# Patient Record
(demographics unavailable — no encounter records)

---

## 2024-10-18 NOTE — HEALTH RISK ASSESSMENT
[Yes] : Yes [4 or more  times a week (4 pts)] : 4 or more  times a week (4 points) [1 or 2 (0 pts)] : 1 or 2 (0 points) [0] : 2) Feeling down, depressed, or hopeless: Not at all (0) [PHQ-2 Negative - No further assessment needed] : PHQ-2 Negative - No further assessment needed [Patient reported mammogram was normal] : Patient reported mammogram was normal [Patient reported PAP Smear was normal] : Patient reported PAP Smear was normal [No] : In the past 12 months have you used drugs other than those required for medical reasons? No [Former] : Former [0-4] : 0-4 [> 15 Years] : > 15 Years [With Patient/Caregiver] : , with patient/caregiver [Designated Healthcare Proxy] : Designated healthcare proxy [Name: ___] : Health Care Proxy's Name: [unfilled]  [Relationship: ___] : Relationship: [unfilled] [With Significant Other] : lives with significant other [# of Members in Household ___] :  household currently consist of [unfilled] member(s) [Retired] : retired [Graduate School] : graduate school [Significant Other] : lives with significant other [Fully functional (bathing, dressing, toileting, transferring, walking, feeding)] : Fully functional (bathing, dressing, toileting, transferring, walking, feeding) [Fully functional (using the telephone, shopping, preparing meals, housekeeping, doing laundry, using] : Fully functional and needs no help or supervision to perform IADLs (using the telephone, shopping, preparing meals, housekeeping, doing laundry, using transportation, managing medications and managing finances) [Smoke Detector] : smoke detector [Carbon Monoxide Detector] : carbon monoxide detector [Audit-CScore] : 4 [de-identified] : treadmill 20-30 minutes [[id number]] : 0 [de-identified] : trivial smoking history -smoked only 2 yrs as a teenager [MammogramDate] : 8/13/24 [MammogramComments] : BIRADS 2 @ [facility] [PapSmearDate] : 9/17/24 [PapSmearComments] : Dr. Ramírez [ColonoscopyComments] : Dr. Brambila [de-identified] : retired  [FreeTextEntry3] : same sex partner [AdvancecareDate] : 10/2024

## 2024-10-18 NOTE — HEALTH RISK ASSESSMENT
[Yes] : Yes [4 or more  times a week (4 pts)] : 4 or more  times a week (4 points) [1 or 2 (0 pts)] : 1 or 2 (0 points) [0] : 2) Feeling down, depressed, or hopeless: Not at all (0) [PHQ-2 Negative - No further assessment needed] : PHQ-2 Negative - No further assessment needed [Patient reported mammogram was normal] : Patient reported mammogram was normal [Patient reported PAP Smear was normal] : Patient reported PAP Smear was normal [No] : In the past 12 months have you used drugs other than those required for medical reasons? No [Former] : Former [0-4] : 0-4 [> 15 Years] : > 15 Years [With Patient/Caregiver] : , with patient/caregiver [Designated Healthcare Proxy] : Designated healthcare proxy [Name: ___] : Health Care Proxy's Name: [unfilled]  [Relationship: ___] : Relationship: [unfilled] [With Significant Other] : lives with significant other [# of Members in Household ___] :  household currently consist of [unfilled] member(s) [Retired] : retired [Graduate School] : graduate school [Significant Other] : lives with significant other [Fully functional (bathing, dressing, toileting, transferring, walking, feeding)] : Fully functional (bathing, dressing, toileting, transferring, walking, feeding) [Fully functional (using the telephone, shopping, preparing meals, housekeeping, doing laundry, using] : Fully functional and needs no help or supervision to perform IADLs (using the telephone, shopping, preparing meals, housekeeping, doing laundry, using transportation, managing medications and managing finances) [Smoke Detector] : smoke detector [Carbon Monoxide Detector] : carbon monoxide detector [Audit-CScore] : 4 [de-identified] : treadmill 20-30 minutes [HXL8Ocyne] : 0 [de-identified] : trivial smoking history -smoked only 2 yrs as a teenager [MammogramDate] : 8/13/24 [MammogramComments] : BIRADS 2 @ [facility] [PapSmearDate] : 9/17/24 [PapSmearComments] : Dr. Ramírez [ColonoscopyComments] : Dr. Brambila [de-identified] : retired  [FreeTextEntry3] : same sex partner [AdvancecareDate] : 10/2024

## 2024-10-18 NOTE — HISTORY OF PRESENT ILLNESS
[de-identified] : HOLLY STOREY is a 64 year F who presents today for an Annual Physical Exam. Her hx is significant for CHD-she is PPM dependent, He also has thyroid disease, seronegative RA-on Humira, and chronic insomnia. She is medicated for all of her conditions.  She has recent labs ordered by her rheumatologist available for my review. They were drawn non-fasting. Both sugar and trigs were elevated.

## 2024-10-18 NOTE — HISTORY OF PRESENT ILLNESS
[de-identified] : HOLLY STOREY is a 64 year F who presents today for an Annual Physical Exam. Her hx is significant for CHD-she is PPM dependent, He also has thyroid disease, seronegative RA-on Humira, and chronic insomnia. She is medicated for all of her conditions.  She has recent labs ordered by her rheumatologist available for my review. They were drawn non-fasting. Both sugar and trigs were elevated.

## 2024-10-18 NOTE — PHYSICAL EXAM
[No Acute Distress] : no acute distress [Well Nourished] : well nourished [Well Developed] : well developed [Well-Appearing] : well-appearing [Normal Sclera/Conjunctiva] : normal sclera/conjunctiva [PERRL] : pupils equal round and reactive to light [EOMI] : extraocular movements intact [Normal Outer Ear/Nose] : the outer ears and nose were normal in appearance [Normal Oropharynx] : the oropharynx was normal [No JVD] : no jugular venous distention [No Lymphadenopathy] : no lymphadenopathy [Supple] : supple [Thyroid Normal, No Nodules] : the thyroid was normal and there were no nodules present [No Respiratory Distress] : no respiratory distress  [No Accessory Muscle Use] : no accessory muscle use [Clear to Auscultation] : lungs were clear to auscultation bilaterally [Normal Rate] : normal rate  [Regular Rhythm] : with a regular rhythm [Normal S1, S2] : normal S1 and S2 [No Murmur] : no murmur heard [No Carotid Bruits] : no carotid bruits [No Edema] : there was no peripheral edema [No Palpable Aorta] : no palpable aorta [No Extremity Clubbing/Cyanosis] : no extremity clubbing/cyanosis [Soft] : abdomen soft [Non Tender] : non-tender [Non-distended] : non-distended [No Masses] : no abdominal mass palpated [No HSM] : no HSM [Normal Bowel Sounds] : normal bowel sounds [Normal Supraclavicular Nodes] : no supraclavicular lymphadenopathy [Normal Anterior Cervical Nodes] : no anterior cervical lymphadenopathy [No CVA Tenderness] : no CVA  tenderness [No Spinal Tenderness] : no spinal tenderness [No Joint Swelling] : no joint swelling [Grossly Normal Strength/Tone] : grossly normal strength/tone [No Rash] : no rash [No Focal Deficits] : no focal deficits [Normal Gait] : normal gait [Deep Tendon Reflexes (DTR)] : deep tendon reflexes were 2+ and symmetric [Speech Grossly Normal] : speech grossly normal [Normal Affect] : the affect was normal [Normal Mood] : the mood was normal

## 2024-10-18 NOTE — REVIEW OF SYSTEMS
[Patient Intake Form Reviewed] : Patient intake form was reviewed [Nasal Discharge] : nasal discharge [Joint Pain] : joint pain [Joint Swelling] : joint swelling [Negative] : Heme/Lymph

## 2024-12-04 NOTE — PHYSICAL EXAM
[de-identified] : Right Knee: Range of Motion:       Claimant:  Normal: Flexion Active    135   135-degrees Flexion Passive    135   135-degrees Extension Active    0-5   0-5-degrees Extension Passive   0-5   0-5-degrees  No weakness to flexion/extension. No evidence of instability in the AP plane or varus or valgus stress. Negative Lachman. Negative pivot shift. Negative anterior drawer test. Negative posterior drawer test. Negative Sneha. Negative Apley grind. No medial or lateral joint line tenderness. Positive tenderness over the medial and lateral facet of the patella. Positive patellofemoral crepitations. No lateral tilting patella. No patella apprehension. Positive crepitation in the medial and lateral femoral condyle. No proximal or distal swelling, edema or tenderness. No gross motor or sensory deficits. Mild intra-articular swelling. 2+ DP and PT pulses. No varus or valgus malalignment. Multiple well healed scars.  Left Knee: Range of Motion:       Claimant:  Normal: Flexion Active    135   135-degrees Flexion Passive    135   135-degrees Extension Active    0-5   0-5-degrees Extension Passive   0-5   0-5-degrees  Multiple well-healed scars. No weakness to flexion/extension. No evidence of instability in the AP plane or varus or valgus stress. Negative Lachman. Negative pivot shift. Negative anterior drawer test. Negative posterior drawer test. Negative Sneha. Negative Apley grind. No medial or lateral joint line tenderness. Positive tenderness over the medial and lateral facet of the patella. Positive patellofemoral crepitations. No lateral tilting patella. No patella apprehension. Positive crepitation in the medial and lateral femoral condyle. No proximal or distal swelling, edema or tenderness. No gross motor or sensory deficits. Mild intra-articular swelling. 2+ DP and PT pulses. No varus or valgus malalignment. Skin is intact. No rashes, scars or lesions.  [de-identified] : Ambulating with a slightly antalgic to antalgic gait.  Station:  Normal.  [de-identified] : Appearance:  Well-developed, well-nourished female in no acute distress.

## 2024-12-04 NOTE — ADDENDUM
[FreeTextEntry1] : This note was written by Leonor Palacio on 12/04/2024 acting as a scribe for GERALD BERRIOS III, MD

## 2024-12-04 NOTE — DISCUSSION/SUMMARY
[de-identified] : At this time, due to osteoarthritis of the bilateral knees, I recommended she start a course of viscosupplementation. She will start the injections after December 24th.

## 2025-01-07 NOTE — PROCEDURE
[de-identified] : Consent: The risks and benefits of the procedure were discussed with the patient in detail.  Upon verbal consent of the patient, we proceeded with the Euflexxa injections as noted below.    Procedure: Under sterile conditions, the patient underwent a Euflexxa injection to the right and left knee of 20 mg sodium hyaluronate, 17 mg sodium chloride, 1.12 mg disodium hydrogen phosphate dodecahydrate, .10 mg sodium dihydrogen phosphate dehydrate in a 2 ml syringe without any complications.  The patient tolerated this well.   Indications: Osteoarthritis, bilateral knees : [employer] NDC#: [address]-0 Lot#:  [id number] Expiration:  11/30/25  Plan:  I have recommended ice and elevation.  The patient will be reassessed in one week for the next Euflexxa injection for the osteoarthritis of the right and left knee..

## 2025-01-07 NOTE — ADDENDUM
[FreeTextEntry1] : This note was written by Sindy Johnson on 01/07/2025 acting as scribe for Anju Franco OTR/CAMIAL, PA.

## 2025-01-07 NOTE — PHYSICAL EXAM
[de-identified] : Right Knee: Range of Motion:  Claimant: Normal: Flexion Active 135 135-degrees Flexion Passive 135 135-degrees Extension Active 0-5 0-5-degrees Extension Passive 0-5 0-5-degrees  No weakness to flexion/extension. No evidence of instability in the AP plane or varus or valgus stress. Negative Lachman. Negative pivot shift. Negative anterior drawer test. Negative posterior drawer test. Negative Sneha. Negative Apley grind. No medial or lateral joint line tenderness. Positive tenderness over the medial and lateral facet of the patella. Positive patellofemoral crepitations. No lateral tilting patella. No patella apprehension. Positive crepitation in the medial and lateral femoral condyle. No proximal or distal swelling, edema or tenderness. No gross motor or sensory deficits. Mild intra-articular swelling. 2+ DP and PT pulses. No varus or valgus malalignment. Multiple well healed scars.  Left Knee: Range of Motion:  Claimant: Normal: Flexion Active 135 135-degrees Flexion Passive 135 135-degrees Extension Active 0-5 0-5-degrees Extension Passive 0-5 0-5-degrees  Multiple well-healed scars. No weakness to flexion/extension. No evidence of instability in the AP plane or varus or valgus stress. Negative Lachman. Negative pivot shift. Negative anterior drawer test. Negative posterior drawer test. Negative Sneha. Negative Apley grind. No medial or lateral joint line tenderness. Positive tenderness over the medial and lateral facet of the patella. Positive patellofemoral crepitations. No lateral tilting patella. No patella apprehension. Positive crepitation in the medial and lateral femoral condyle. No proximal or distal swelling, edema or tenderness. No gross motor or sensory deficits. Mild intra-articular swelling. 2+ DP and PT pulses. No varus or valgus malalignment.

## 2025-01-13 NOTE — PHYSICAL EXAM
[de-identified] : Right Knee: Range of Motion: Claimant: Normal: Flexion Active 135 135-degrees Flexion Passive 135 135-degrees Extension Active 0-5 0-5-degrees Extension Passive 0-5 0-5-degrees  No weakness to flexion/extension. No evidence of instability in the AP plane or varus or valgus stress. Negative Lachman. Negative pivot shift. Negative anterior drawer test. Negative posterior drawer test. Negative Sneha. Negative Apley grind. No medial or lateral joint line tenderness. Positive tenderness over the medial and lateral facet of the patella. Positive patellofemoral crepitations. No lateral tilting patella. No patella apprehension. Positive crepitation in the medial and lateral femoral condyle. No proximal or distal swelling, edema or tenderness. No gross motor or sensory deficits. Mild intra-articular swelling. 2+ DP and PT pulses. No varus or valgus malalignment. Multiple well healed scars.  Left Knee: Range of Motion: Claimant: Normal: Flexion Active 135 135-degrees Flexion Passive 135 135-degrees Extension Active 0-5 0-5-degrees Extension Passive 0-5 0-5-degrees  Multiple well-healed scars. No weakness to flexion/extension. No evidence of instability in the AP plane or varus or valgus stress. Negative Lachman. Negative pivot shift. Negative anterior drawer test. Negative posterior drawer test. Negative Sneha. Negative Apley grind. No medial or lateral joint line tenderness. Positive tenderness over the medial and lateral facet of the patella. Positive patellofemoral crepitations. No lateral tilting patella. No patella apprehension. Positive crepitation in the medial and lateral femoral condyle. No proximal or distal swelling, edema or tenderness. No gross motor or sensory deficits. Mild intra-articular swelling. 2+ DP and PT pulses. No varus or valgus malalignment.

## 2025-01-13 NOTE — ADDENDUM
[FreeTextEntry1] : This note was written by Leonor Palacio on 01/13/2025 acting as scribe for Anju Franco, OTR/L, PA

## 2025-01-13 NOTE — PROCEDURE
[de-identified] : Consent: The risks and benefits of the procedure were discussed with the patient in detail. Upon verbal consent of the patient, we proceeded with the Euflexxa injections as noted below.  Procedure: Under sterile conditions, the patient underwent a Euflexxa injection to the right and left knee of 20 mg sodium hyaluronate, 17 mg sodium chloride, 1.12 mg disodium hydrogen phosphate dodecahydrate,.10 mg sodium dihydrogen phosphate dehydrate in a 2 ml syringe without any complications. The patient tolerated this well.  Indications: Osteoarthritis, bilateral knees : [employer] NDC#: [address]-7 Lot#: [id number] Expiration: 11/30/25  Plan: I have recommended ice and elevation. The patient will be reassessed in one week for the next Euflexxa injection for the osteoarthritis of the right and left knee.

## 2025-02-19 NOTE — PHYSICAL EXAM
[Normal Sclera/Conjunctiva] : normal sclera/conjunctiva [Normal Outer Ear/Nose] : the outer ears and nose were normal in appearance [Normal] : normal rate, regular rhythm, normal S1 and S2 and no murmur heard [No Edema] : there was no peripheral edema [Soft] : abdomen soft [Non Tender] : non-tender [Non-distended] : non-distended [Normal Supraclavicular Nodes] : no supraclavicular lymphadenopathy [Normal Anterior Cervical Nodes] : no anterior cervical lymphadenopathy [No Spinal Tenderness] : no spinal tenderness [Grossly Normal Strength/Tone] : grossly normal strength/tone [No Focal Deficits] : no focal deficits [Normal Gait] : normal gait [Speech Grossly Normal] : speech grossly normal [Normal Affect] : the affect was normal [Normal Mood] : the mood was normal

## 2025-02-19 NOTE — HISTORY OF PRESENT ILLNESS
[de-identified] : HOLLY STOREY is a 64 year F who presents today to follow-up- with complaints of not feeling well. She c/o episodic dizziness that seems to occur mostly when trying to exercise. Feels a little SOB on exercising. Not able to complete exercise with same intensity as she did prior to PPM placement. She saw the NP @ [facility] -settings were adjusted-but still doesn't feel 'right'. Says her SBP sometimes elevates to high 130s-140. She says thyroid meds lowered 6 mos ago by endocrinology. Gained a few pounds since then.  She takes Rinvoq for her RA. Humira stopped working. Finally, She is also c/o sinus congestion- previously used Flonase with good results.

## 2025-02-19 NOTE — HISTORY OF PRESENT ILLNESS
[de-identified] : HOLLY STOREY is a 64 year F who presents today to follow-up- with complaints of not feeling well. She c/o episodic dizziness that seems to occur mostly when trying to exercise. Feels a little SOB on exercising. Not able to complete exercise with same intensity as she did prior to PPM placement. She saw the NP @ [facility] -settings were adjusted-but still doesn't feel 'right'. Says her SBP sometimes elevates to high 130s-140. She says thyroid meds lowered 6 mos ago by endocrinology. Gained a few pounds since then.  She takes Rinvoq for her RA. Humira stopped working. Finally, She is also c/o sinus congestion- previously used Flonase with good results.

## 2025-02-19 NOTE — HEALTH RISK ASSESSMENT
[Former] : Former [0-4] : 0-4 [> 15 Years] : > 15 Years [de-identified] : trivial smoking history -smoked only 2 yrs as a teenager

## 2025-02-19 NOTE — HEALTH RISK ASSESSMENT
[Former] : Former [0-4] : 0-4 [> 15 Years] : > 15 Years [de-identified] : trivial smoking history -smoked only 2 yrs as a teenager

## 2025-03-04 NOTE — REVIEW OF SYSTEMS
[Feeling Fatigued] : feeling fatigued [Dyspnea on exertion] : dyspnea during exertion [Negative] : Psychiatric [SOB] : no shortness of breath [Chest Discomfort] : no chest discomfort [Leg Claudication] : no intermittent leg claudication [Palpitations] : no palpitations [Orthopnea] : no orthopnea [Syncope] : no syncope

## 2025-03-04 NOTE — CARDIOLOGY SUMMARY
[de-identified] : 3.4.35: a sensed v paced  3.4.25: SR with LBBB [de-identified] : 3.2023: exercise stress test: - normal exercise capacity, ECG negative for ischemia, normal HR and PB response, no arrhythmia, negative for ischemia  [de-identified] : 10.9.24: TTE: LV normal in size, concentric remodeling of LV, normal global wall motion, EF 55-60%, normal wall thickness. LA/RA normal in size, normal RV size and function. trace MR/TR.

## 2025-03-04 NOTE — ASSESSMENT
[FreeTextEntry1] : 64-year-old female with PMH of hypothyroid, rheumatoid arthritis, complete heart block s/p PPM (4.2024 MDT). In December 2024, she reported dizziness when exercising once HR reached ~130bpm. Upper tracking rate increased to 145bpm; AV delays adjusted to 300ms. She continues to have worsening dizziness, fatigue, WHEELER.   Dizziness/ PPM setting changes - Baseline ECG when V sensed shows LBBB- when V paced, LBBB is corrected, LVAT ~45ms - Paced AV interval changed from 300ms to 180, sensed AV 160ms - Rate adaptive AV turned on - DDDR 60- 145bpm upper sensor 130bpm - if symptoms persist consider increasing ADL and ADL response   ?HTN - pt will keep diary log of BP to share next visit, call office sooner if BP sustained >140/80 - would consider starting low dose ARB if essential hypertension found on BP log  F/u in 3 weeks

## 2025-03-04 NOTE — HISTORY OF PRESENT ILLNESS
[FreeTextEntry1] : This is a 64-year-old female with PMH of hypothyroid, rheumatoid arthritis, complete heart block s/p PPM (4.2024 MDT). In December 2024, she reported dizziness when exercising once HR reached ~130bpm. Upper tracking rate increased to 145bpm; AV delays adjusted to 300ms. She followed up a week later with improvement in her symptoms. She presents today with worsening dizziness with mild to moderate activity, extreme fatigue, and WHEELER.

## 2025-03-04 NOTE — CARDIOLOGY SUMMARY
[de-identified] : 3.4.35: a sensed v paced  3.4.25: SR with LBBB [de-identified] : 3.2023: exercise stress test: - normal exercise capacity, ECG negative for ischemia, normal HR and PB response, no arrhythmia, negative for ischemia  [de-identified] : 10.9.24: TTE: LV normal in size, concentric remodeling of LV, normal global wall motion, EF 55-60%, normal wall thickness. LA/RA normal in size, normal RV size and function. trace MR/TR.

## 2025-04-01 NOTE — CARDIOLOGY SUMMARY
[de-identified] : 3.4.35: a sensed v paced  3.4.25: SR with LBBB [de-identified] : 3.2023: exercise stress test: - normal exercise capacity, ECG negative for ischemia, normal HR and PB response, no arrhythmia, negative for ischemia  [de-identified] : 10.9.24: TTE: LV normal in size, concentric remodeling of LV, normal global wall motion, EF 55-60%, normal wall thickness. LA/RA normal in size, normal RV size and function. trace MR/TR.

## 2025-04-01 NOTE — ASSESSMENT
[FreeTextEntry1] : 64-year-old female with PMH of hypothyroid, rheumatoid arthritis, complete heart block s/p PPM (4.2024 MDT). In December 2024, she reported dizziness when exercising once HR reached ~130bpm. Last seen in March with DVC changes to paced/sensed AV intervals with rate adaptive AV turned on. Pt does report improvement in dizziness, fatigue, however, continues to have WHEELER with stair climbing.    Dizziness/ PPM setting changes - symptoms of dizziness and fatigued improved with paced/sensed AV interval changes (see prior note)  - no further changes made to PPM - symptoms of WHEELER could be overlap with need for conditioning vs limitations on PPM settings - continue to monitor remotely and she will report any symptom changes   Elevated BP - BP log which shows 130-149/80-94. Continue lifestyle changes such as decreasing salt, continue exercise as tolerated  - pt has f/u with Dr. Golden in May, if BP log remains elevated consider starting low dose ARB if essential hypertension found on BP log  F/u in 6 months

## 2025-04-01 NOTE — HISTORY OF PRESENT ILLNESS
[FreeTextEntry1] : This is a 64-year-old female with PMH of hypothyroid, rheumatoid arthritis, complete heart block s/p PPM (4.2024 MDT). In December 2024, she reported dizziness when exercising once HR reached ~130bpm. Last seen in March with DVC changes to paced/sensed AV intervals with rate adaptive AV turned on. Pt does report improvement in dizziness, fatigue, however, continues to have WHEELER with stair climbing.  Last visit BP was noted to be elevated, she shared BP log which shows -149/80-94. She is very active and committed to decreasing salt and continuing activities.

## 2025-04-01 NOTE — REVIEW OF SYSTEMS
[Dyspnea on exertion] : dyspnea during exertion [SOB] : no shortness of breath [Chest Discomfort] : no chest discomfort [Leg Claudication] : no intermittent leg claudication [Palpitations] : no palpitations [Orthopnea] : no orthopnea [Syncope] : no syncope [Negative] : Constitutional

## 2025-06-09 NOTE — HISTORY OF PRESENT ILLNESS
[de-identified] : The patient comes in today for a five month follow up and she wants to order visco for the bilateral knees.

## 2025-06-09 NOTE — ADDENDUM
[FreeTextEntry1] : This note was written by Leonor Palacio on 06/09/2025 acting as scribe for Anju Franco, OTR/L, PA

## 2025-06-09 NOTE — DISCUSSION/SUMMARY
[de-identified] : At this time, due to osteoarthritis of the bilateral knees, we will order Euflexxa for her again. The patient states she has been doing very well with the injections, so every six months we will continue doing the Euflexxa for her. She has been doing home exercises in between and she will continue to do that and we will follow up with her.

## 2025-06-09 NOTE — PHYSICAL EXAM
[de-identified] : Right Knee: Range of Motion in Degrees                     Claimant: Normal:  Flexion Active   135                  135-degrees  Flexion Passive   135                 135-degrees  Extension Active   0-5                 0-5-degrees  Extension Passive   0-5                 0-5-degrees    No weakness to flexion/extension.  No evidence of instability in the AP plane or varus or valgus stress.  Negative Lachman. Negative pivot shift.  Negative anterior drawer test.  Negative posterior drawer test.  Negative Sneha.  Negative Apley grind.  No medial or lateral joint line tenderness.  Positive tenderness over the medial and lateral facet of the patella.  Positive patellofemoral crepitations.  No lateral tilting patella.  No patella apprehension.  Positive crepitation in the medial and lateral femoral condyle.  No proximal or distal swelling, edema or tenderness.  No gross motor or sensory deficits.  Mild intra-articular swelling.  2+ DP and PT pulses.  No varus or valgus malalignment.  Skin is intact.  No rashes, scars or lesions.   Left Knee: Range of Motion in Degrees                     Claimant: Normal:  Flexion Active   135                  135-degrees  Flexion Passive   135                 135-degrees  Extension Active   0-5                 0-5-degrees  Extension Passive   0-5                 0-5-degrees    No weakness to flexion/extension.  No evidence of instability in the AP plane or varus or valgus stress.  Negative Lachman. Negative pivot shift.  Negative anterior drawer test.  Negative posterior drawer test.  Negative Sneha.  Negative Apley grind.  No medial or lateral joint line tenderness.  Positive tenderness over the medial and lateral facet of the patella.  Positive patellofemoral crepitations.  No lateral tilting patella.  No patella apprehension.  Positive crepitation in the medial and lateral femoral condyle.  No proximal or distal swelling, edema or tenderness.  No gross motor or sensory deficits.  Mild intra-articular swelling.  2+ DP and PT pulses.  No varus or valgus malalignment.  Skin is intact.  No rashes, scars or lesions.  [de-identified] : Ambulating with a slightly antalgic to antalgic gait.  Station:  Normal.  [de-identified] : Appearance:  Well-developed, well-nourished female in no acute distress.    [de-identified] : Radiographs, which were taken in the office today, one-two views of the right knee and one-two views of the left knee, including AP Standing, reveals osteoarthritis of the bilateral knees.

## 2025-06-10 NOTE — PLAN
[FreeTextEntry1] :  THIS VISIT WAS PART OF AN ONGOING LONGITUDAL RELATIONSHIP DESIGNED TO ADDRESS THE MAJORITY OF THE PATIENT'S HEALTH CARE NEEDS WITH CONSISTENCY OVER A LONG PERIOD OF TIME.

## 2025-06-10 NOTE — HISTORY OF PRESENT ILLNESS
[de-identified] : HOLLY STOREY is a 64 year F who presents today to follow-up- on her BP. She has been taking home readings over the last several weeks and report that many of the readings are borderline. There is HTN in her family.  She has been trying to focus on her diet-but reports that upon review- there is little in the way of sodium in her diet.

## 2025-06-10 NOTE — PHYSICAL EXAM
[Normal] : no acute distress, well nourished, well developed and well-appearing [Normal Sclera/Conjunctiva] : normal sclera/conjunctiva [Normal Outer Ear/Nose] : the outer ears and nose were normal in appearance [No Respiratory Distress] : no respiratory distress  [No Accessory Muscle Use] : no accessory muscle use [Normal Rate] : normal rate  [Regular Rhythm] : with a regular rhythm [No Edema] : there was no peripheral edema [Speech Grossly Normal] : speech grossly normal [Normal Affect] : the affect was normal [Normal Mood] : the mood was normal [de-identified] : 146/90

## 2025-06-26 NOTE — PHYSICAL EXAM
[Normal] : no acute distress, well nourished, well developed and well-appearing [Normal Sclera/Conjunctiva] : normal sclera/conjunctiva [Normal Outer Ear/Nose] : the outer ears and nose were normal in appearance [No Respiratory Distress] : no respiratory distress  [No Accessory Muscle Use] : no accessory muscle use [No Focal Deficits] : no focal deficits [Normal Gait] : normal gait [Speech Grossly Normal] : speech grossly normal [Normal Mood] : the mood was normal [No Lymphadenopathy] : no lymphadenopathy [Normal Rate] : normal rate  [Grossly Normal Strength/Tone] : grossly normal strength/tone [Alert and Oriented x3] : oriented to person, place, and time

## 2025-06-26 NOTE — HISTORY OF PRESENT ILLNESS
[de-identified] : HOLLY STOREY is a 64 year F who presents today for a BP check. She is tolerating the Norvasc with lower home BP readings. Most SBP readings in the 130s now. She reports no adverse effects related to the new BP medication. She is also requesting refills on her insomnia medication. She used Ambien with pretty good results when she is having trouble sleeping.

## 2025-06-26 NOTE — HISTORY OF PRESENT ILLNESS
[de-identified] : HOLLY STOREY is a 64 year F who presents today for a BP check. She is tolerating the Norvasc with lower home BP readings. Most SBP readings in the 130s now. She reports no adverse effects related to the new BP medication. She is also requesting refills on her insomnia medication. She used Ambien with pretty good results when she is having trouble sleeping.

## 2025-07-10 NOTE — REASON FOR VISIT
[Follow-Up Visit] : a follow-up visit for [FreeTextEntry2] : her bilateral knees and first Euflexxa injection to her bilateral knees.

## 2025-07-10 NOTE — HISTORY OF PRESENT ILLNESS
[de-identified] : The patient comes in today with complaints of pain to her bilateral knees with multiple episodes of the left knee catching, popping and locking.

## 2025-07-10 NOTE — PROCEDURE
[de-identified] : Indication:  Osteoarthritis of the right knee Osteoarthritis of the left knee  Consent: The risks and benefits of the procedures were discussed with the patient in detail.  Upon verbal consent of the patient, we proceeded with the Euflexxa injections as noted below.    Description of Procedure: Under sterile conditions, the patient underwent Euflexxa injections to the right and left knee of 20 mg sodium Hyaluronate, 17 mg sodium chloride, 1.12 mg disodium hydrogen phosphate dodecahydrate, .10 mg sodium dihydrogen phosphate dehydrate in a 2 mL syringe without any complications.  The patient tolerated this well.   :  [employer] NDC#:  [address]-1 Lot#:   [id number] Exp Date:   05/05/2026

## 2025-07-10 NOTE — PHYSICAL EXAM
[de-identified] : Right Knee: Knee:  Range of Motion in Degrees:                    Claimant: Normal:  Flexion Active   135                  135-degrees  Flexion Passive   135                 135-degrees  Extension Active   0-5                 0-5-degrees  Extension Passive   0-5                 0-5-degrees    No weakness to flexion/extension.  No evidence of instability in the AP plane or varus or valgus stress.  Negative Lachman. Negative pivot shift.  Negative anterior drawer test.  Negative posterior drawer test.  Negative Sneha.  Negative Apley grind.  No medial or lateral joint line tenderness.  Positive tenderness over the medial and lateral facet of the patella.  Positive patellofemoral crepitations.  No lateral tilting patella.  No patella apprehension.  Positive crepitation in the medial and lateral femoral condyle.  No proximal or distal swelling, edema or tenderness.  No gross motor or sensory deficits.  Mild intra-articular swelling.  2+ DP and PT pulses.  No varus or valgus malalignment.  Skin is intact.  No rashes, scars or lesions.   Left Knee: Knee:  Range of Motion in Degrees:                    Claimant: Normal:  Flexion Active   135                  135-degrees  Flexion Passive   135                 135-degrees  Extension Active   0-5                 0-5-degrees  Extension Passive   0-5                 0-5-degrees    No weakness to flexion/extension.  No evidence of instability in the AP plane or varus or valgus stress.  Negative Lachman. Negative pivot shift.  Negative anterior drawer test.  Negative posterior drawer test.  Negative Sneha.  Negative Apley grind.  No medial or lateral joint line tenderness.  Positive tenderness over the medial and lateral facet of the patella.  Positive patellofemoral crepitations.  No lateral tilting patella.  No patella apprehension.  Positive crepitation in the medial and lateral femoral condyle.  No proximal or distal swelling, edema or tenderness.  No gross motor or sensory deficits.  Mild intra-articular swelling.  2+ DP and PT pulses.  No varus or valgus malalignment.  Skin is intact.  No rashes, scars or lesions.   [de-identified] : Gait and Station:  Ambulating with a slightly antalgic to antalgic gait.  Station:  Normal.  [de-identified] : Appearance:  Well-developed, well-nourished female in no acute distress.

## 2025-07-10 NOTE — ADDENDUM
[FreeTextEntry1] : This note was written by Mel Rodriguez on 07/10/2025 acting as scribe for Rito Salcedo III, MD

## 2025-07-10 NOTE — DISCUSSION/SUMMARY
[de-identified] : The patient presents with osteoarthritis of the bilateral knees.  At this time, she was given Euflexxa injections to her bilateral knees.  I have recommended ice and elevation.  The patient will be reassessed in one week for the next Euflexxa injections for the osteoarthritis of the right and left knees.    Of note, because her left knee has had multiple episodes of catching, popping and locking, she will get an MRI of the left knee.  Please note the patient has had other conservative treatments without relief.

## 2025-07-15 NOTE — PHYSICAL EXAM
[de-identified] : Right Knee: Knee: Range of Motion in Degrees:   Claimant: Normal: Flexion Active 135   135-degrees Flexion Passive 135   135-degrees Extension Active 0-5   0-5-degrees Extension Passive 0-5   0-5-degrees  No weakness to flexion/extension. No evidence of instability in the AP plane or varus or valgus stress. Negative Lachman. Negative pivot shift. Negative anterior drawer test. Negative posterior drawer test. Negative Sneha. Negative Apley grind. No medial or lateral joint line tenderness. Positive tenderness over the medial and lateral facet of the patella. Positive patellofemoral crepitations. No lateral tilting patella. No patella apprehension. Positive crepitation in the medial and lateral femoral condyle. No proximal or distal swelling, edema or tenderness. No gross motor or sensory deficits. Mild intra-articular swelling. 2+ DP and PT pulses. No varus or valgus malalignment. Skin is intact. No rashes, scars or lesions.  Left Knee: Knee: Range of Motion in Degrees:   Claimant: Normal: Flexion Active 135   135-degrees Flexion Passive 135   135-degrees Extension Active 0-5   0-5-degrees Extension Passive 0-5   0-5-degrees  No weakness to flexion/extension. No evidence of instability in the AP plane or varus or valgus stress. Negative Lachman. Negative pivot shift. Negative anterior drawer test. Negative posterior drawer test. Negative Sneha. Negative Apley grind. No medial or lateral joint line tenderness. Positive tenderness over the medial and lateral facet of the patella. Positive patellofemoral crepitations. No lateral tilting patella. No patella apprehension. Positive crepitation in the medial and lateral femoral condyle. No proximal or distal swelling, edema or tenderness. No gross motor or sensory deficits. Mild intra-articular swelling. 2+ DP and PT pulses. No varus or valgus malalignment. Skin is intact. No rashes, scars or lesions.

## 2025-07-15 NOTE — PROCEDURE
[de-identified] : Indication:  Osteoarthritis of the right knee Osteoarthritis of the left knee  Consent: The risks and benefits of the procedures were discussed with the patient in detail.  Upon verbal consent of the patient, we proceeded with the Euflexxa injections as noted below.    Description of Procedure: Under sterile conditions, the patient underwent Euflexxa injections to the right and left knee of 20 mg sodium Hyaluronate, 17 mg sodium chloride, 1.12 mg disodium hydrogen phosphate dodecahydrate, .10 mg sodium dihydrogen phosphate dehydrate in a 2 mL syringe without any complications.  The patient tolerated this well.   :  [employer] NDC#:  [address]-4 Lot#:   [id number] Exp Date:   05/05/2026  Plan:  I have recommended ice and elevation.  The patient will be reassessed in one week for the next Euflexxa injection for the osteoarthritis of the right and left knee.

## 2025-07-15 NOTE — PROCEDURE
[de-identified] : Indication:  Osteoarthritis of the right knee Osteoarthritis of the left knee  Consent: The risks and benefits of the procedures were discussed with the patient in detail.  Upon verbal consent of the patient, we proceeded with the Euflexxa injections as noted below.    Description of Procedure: Under sterile conditions, the patient underwent Euflexxa injections to the right and left knee of 20 mg sodium Hyaluronate, 17 mg sodium chloride, 1.12 mg disodium hydrogen phosphate dodecahydrate, .10 mg sodium dihydrogen phosphate dehydrate in a 2 mL syringe without any complications.  The patient tolerated this well.   :  [employer] NDC#:  [address]-3 Lot#:   [id number] Exp Date:   05/05/2026  Plan:  I have recommended ice and elevation.  The patient will be reassessed in one week for the next Euflexxa injection for the osteoarthritis of the right and left knee.

## 2025-07-15 NOTE — PHYSICAL EXAM
[de-identified] : Right Knee: Knee: Range of Motion in Degrees:   Claimant: Normal: Flexion Active 135   135-degrees Flexion Passive 135   135-degrees Extension Active 0-5   0-5-degrees Extension Passive 0-5   0-5-degrees  No weakness to flexion/extension. No evidence of instability in the AP plane or varus or valgus stress. Negative Lachman. Negative pivot shift. Negative anterior drawer test. Negative posterior drawer test. Negative Sneha. Negative Apley grind. No medial or lateral joint line tenderness. Positive tenderness over the medial and lateral facet of the patella. Positive patellofemoral crepitations. No lateral tilting patella. No patella apprehension. Positive crepitation in the medial and lateral femoral condyle. No proximal or distal swelling, edema or tenderness. No gross motor or sensory deficits. Mild intra-articular swelling. 2+ DP and PT pulses. No varus or valgus malalignment. Skin is intact. No rashes, scars or lesions.  Left Knee: Knee: Range of Motion in Degrees:   Claimant: Normal: Flexion Active 135   135-degrees Flexion Passive 135   135-degrees Extension Active 0-5   0-5-degrees Extension Passive 0-5   0-5-degrees  No weakness to flexion/extension. No evidence of instability in the AP plane or varus or valgus stress. Negative Lachman. Negative pivot shift. Negative anterior drawer test. Negative posterior drawer test. Negative Sneha. Negative Apley grind. No medial or lateral joint line tenderness. Positive tenderness over the medial and lateral facet of the patella. Positive patellofemoral crepitations. No lateral tilting patella. No patella apprehension. Positive crepitation in the medial and lateral femoral condyle. No proximal or distal swelling, edema or tenderness. No gross motor or sensory deficits. Mild intra-articular swelling. 2+ DP and PT pulses. No varus or valgus malalignment. Skin is intact. No rashes, scars or lesions.

## 2025-07-15 NOTE — ADDENDUM
[FreeTextEntry1] : This note was written by Mel Rodriguez on 07/15/2025 acting as scribe for Rito Salcedo III, MD

## 2025-07-21 NOTE — PROCEDURE
[de-identified] : Consent: The risks and benefits of the procedure were discussed with the patient in detail.  Upon verbal consent of the patient, we proceeded with the Euflexxa injections as noted below.    Procedure: Under sterile conditions, the patient underwent a Euflexxa injection to the right and left knee of 20 mg sodium hyaluronate, 17 mg sodium chloride, 1.12 mg disodium hydrogen phosphate dodecahydrate, .10 mg sodium dihydrogen phosphate dehydrate in a 2 ml syringe without any complications.  The patient tolerated this well.   Indications: Osteoarthritis, bilateral knees : [employer] NDC#: [address]-2 Lot#:    [id number] Expiration: 05/05/26

## 2025-07-21 NOTE — DISCUSSION/SUMMARY
[de-identified] :  At this time, I have recommended ice and elevation.  The patient will be reassessed in six to eight weeks for the osteoarthritis of the right and left knee.  I also recommended a Playmaker brace for stability on the left knee, and that she undergo a course of physical therapy.  The patient was prescribed a rigid support Playmaker knee brace with range of motion joints.  The brace will safely protect the patient and help to facilitate healing by stabilizing and controlling the knee.  In order to prevent skin breakdown along bony prominences and to avoid compression of the peroneal nerve, a custom fit is necessary.

## 2025-07-21 NOTE — DISCUSSION/SUMMARY
[de-identified] :  At this time, I have recommended ice and elevation.  The patient will be reassessed in six to eight weeks for the osteoarthritis of the right and left knee.  I also recommended a Playmaker brace for stability on the left knee, and that she undergo a course of physical therapy.  The patient was prescribed a rigid support Playmaker knee brace with range of motion joints.  The brace will safely protect the patient and help to facilitate healing by stabilizing and controlling the knee.  In order to prevent skin breakdown along bony prominences and to avoid compression of the peroneal nerve, a custom fit is necessary.

## 2025-07-21 NOTE — PROCEDURE
[de-identified] : Consent: The risks and benefits of the procedure were discussed with the patient in detail.  Upon verbal consent of the patient, we proceeded with the Euflexxa injections as noted below.    Procedure: Under sterile conditions, the patient underwent a Euflexxa injection to the right and left knee of 20 mg sodium hyaluronate, 17 mg sodium chloride, 1.12 mg disodium hydrogen phosphate dodecahydrate, .10 mg sodium dihydrogen phosphate dehydrate in a 2 ml syringe without any complications.  The patient tolerated this well.   Indications: Osteoarthritis, bilateral knees : [employer] NDC#: [address]-5 Lot#:    [id number] Expiration: 05/05/26

## 2025-07-21 NOTE — PHYSICAL EXAM
[de-identified] : Right Knee: Knee: Range of Motion in Degrees:   Claimant: Normal: Flexion Active 135   135-degrees Flexion Passive 135   135-degrees Extension Active 0-5   0-5-degrees Extension Passive 0-5   0-5-degrees  No weakness to flexion/extension. No evidence of instability in the AP plane or varus or valgus stress. Negative Lachman. Negative pivot shift. Negative anterior drawer test. Negative posterior drawer test. Negative Sneha. Negative Apley grind. No medial or lateral joint line tenderness. Positive tenderness over the medial and lateral facet of the patella. Positive patellofemoral crepitations. No lateral tilting patella. No patella apprehension. Positive crepitation in the medial and lateral femoral condyle. No proximal or distal swelling, edema or tenderness. No gross motor or sensory deficits. Mild intra-articular swelling. 2+ DP and PT pulses. No varus or valgus malalignment. Skin is intact. No rashes, scars or lesions.  Left Knee: Knee: Range of Motion in Degrees:   Claimant: Normal: Flexion Active 135   135-degrees Flexion Passive 135   135-degrees Extension Active 0-5   0-5-degrees Extension Passive 0-5   0-5-degrees  No weakness to flexion/extension. No evidence of instability in the AP plane or varus or valgus stress. Negative Lachman. Negative pivot shift. Negative anterior drawer test. Negative posterior drawer test. Negative Sneha. Negative Apley grind. No medial or lateral joint line tenderness. Positive tenderness over the medial and lateral facet of the patella. Positive patellofemoral crepitations. No lateral tilting patella. No patella apprehension. Positive crepitation in the medial and lateral femoral condyle. No proximal or distal swelling, edema or tenderness. No gross motor or sensory deficits. Mild intra-articular swelling. 2+ DP and PT pulses. No varus or valgus malalignment. Skin is intact. No rashes, scars or lesions.   [de-identified] : Review of MRI of the left knee is consistent with arthritis.  No obvious loose body or meniscal tears.

## 2025-07-21 NOTE — ADDENDUM
[FreeTextEntry1] : This note was written by Sindy Johnson on 07/17/2025 acting as scribe for Rito Salcedo III, MD

## 2025-07-21 NOTE — DISCUSSION/SUMMARY
[de-identified] :  At this time, I have recommended ice and elevation.  The patient will be reassessed in six to eight weeks for the osteoarthritis of the right and left knee.  I also recommended a Playmaker brace for stability on the left knee, and that she undergo a course of physical therapy.  The patient was prescribed a rigid support Playmaker knee brace with range of motion joints.  The brace will safely protect the patient and help to facilitate healing by stabilizing and controlling the knee.  In order to prevent skin breakdown along bony prominences and to avoid compression of the peroneal nerve, a custom fit is necessary.   1 = Total assistance

## 2025-07-21 NOTE — HISTORY OF PRESENT ILLNESS
[de-identified] : The patient comes in today for her bilateral knees.  Review of MRI is noted below.

## 2025-07-21 NOTE — HISTORY OF PRESENT ILLNESS
[de-identified] : The patient comes in today for her bilateral knees.  Review of MRI is noted below.

## 2025-07-21 NOTE — PROCEDURE
[de-identified] : Consent: The risks and benefits of the procedure were discussed with the patient in detail.  Upon verbal consent of the patient, we proceeded with the Euflexxa injections as noted below.    Procedure: Under sterile conditions, the patient underwent a Euflexxa injection to the right and left knee of 20 mg sodium hyaluronate, 17 mg sodium chloride, 1.12 mg disodium hydrogen phosphate dodecahydrate, .10 mg sodium dihydrogen phosphate dehydrate in a 2 ml syringe without any complications.  The patient tolerated this well.   Indications: Osteoarthritis, bilateral knees : [employer] NDC#: [address]-7 Lot#:    [id number] Expiration: 05/05/26

## 2025-07-21 NOTE — PHYSICAL EXAM
[de-identified] : Right Knee: Knee: Range of Motion in Degrees:   Claimant: Normal: Flexion Active 135   135-degrees Flexion Passive 135   135-degrees Extension Active 0-5   0-5-degrees Extension Passive 0-5   0-5-degrees  No weakness to flexion/extension. No evidence of instability in the AP plane or varus or valgus stress. Negative Lachman. Negative pivot shift. Negative anterior drawer test. Negative posterior drawer test. Negative Sneha. Negative Apley grind. No medial or lateral joint line tenderness. Positive tenderness over the medial and lateral facet of the patella. Positive patellofemoral crepitations. No lateral tilting patella. No patella apprehension. Positive crepitation in the medial and lateral femoral condyle. No proximal or distal swelling, edema or tenderness. No gross motor or sensory deficits. Mild intra-articular swelling. 2+ DP and PT pulses. No varus or valgus malalignment. Skin is intact. No rashes, scars or lesions.  Left Knee: Knee: Range of Motion in Degrees:   Claimant: Normal: Flexion Active 135   135-degrees Flexion Passive 135   135-degrees Extension Active 0-5   0-5-degrees Extension Passive 0-5   0-5-degrees  No weakness to flexion/extension. No evidence of instability in the AP plane or varus or valgus stress. Negative Lachman. Negative pivot shift. Negative anterior drawer test. Negative posterior drawer test. Negative Sneha. Negative Apley grind. No medial or lateral joint line tenderness. Positive tenderness over the medial and lateral facet of the patella. Positive patellofemoral crepitations. No lateral tilting patella. No patella apprehension. Positive crepitation in the medial and lateral femoral condyle. No proximal or distal swelling, edema or tenderness. No gross motor or sensory deficits. Mild intra-articular swelling. 2+ DP and PT pulses. No varus or valgus malalignment. Skin is intact. No rashes, scars or lesions.   [de-identified] : Review of MRI of the left knee is consistent with arthritis.  No obvious loose body or meniscal tears.

## 2025-07-21 NOTE — PHYSICAL EXAM
[de-identified] : Right Knee: Knee: Range of Motion in Degrees:   Claimant: Normal: Flexion Active 135   135-degrees Flexion Passive 135   135-degrees Extension Active 0-5   0-5-degrees Extension Passive 0-5   0-5-degrees  No weakness to flexion/extension. No evidence of instability in the AP plane or varus or valgus stress. Negative Lachman. Negative pivot shift. Negative anterior drawer test. Negative posterior drawer test. Negative Sneha. Negative Apley grind. No medial or lateral joint line tenderness. Positive tenderness over the medial and lateral facet of the patella. Positive patellofemoral crepitations. No lateral tilting patella. No patella apprehension. Positive crepitation in the medial and lateral femoral condyle. No proximal or distal swelling, edema or tenderness. No gross motor or sensory deficits. Mild intra-articular swelling. 2+ DP and PT pulses. No varus or valgus malalignment. Skin is intact. No rashes, scars or lesions.  Left Knee: Knee: Range of Motion in Degrees:   Claimant: Normal: Flexion Active 135   135-degrees Flexion Passive 135   135-degrees Extension Active 0-5   0-5-degrees Extension Passive 0-5   0-5-degrees  No weakness to flexion/extension. No evidence of instability in the AP plane or varus or valgus stress. Negative Lachman. Negative pivot shift. Negative anterior drawer test. Negative posterior drawer test. Negative Sneha. Negative Apley grind. No medial or lateral joint line tenderness. Positive tenderness over the medial and lateral facet of the patella. Positive patellofemoral crepitations. No lateral tilting patella. No patella apprehension. Positive crepitation in the medial and lateral femoral condyle. No proximal or distal swelling, edema or tenderness. No gross motor or sensory deficits. Mild intra-articular swelling. 2+ DP and PT pulses. No varus or valgus malalignment. Skin is intact. No rashes, scars or lesions.   [de-identified] : Review of MRI of the left knee is consistent with arthritis.  No obvious loose body or meniscal tears.

## 2025-07-21 NOTE — HISTORY OF PRESENT ILLNESS
[de-identified] : The patient comes in today for her bilateral knees.  Review of MRI is noted below.

## 2025-07-24 NOTE — PHYSICAL EXAM
[Normal Sclera/Conjunctiva] : normal sclera/conjunctiva [Normal] : no respiratory distress, lungs were clear to auscultation bilaterally and no accessory muscle use [Normal Rate] : normal rate  [Regular Rhythm] : with a regular rhythm [No Edema] : there was no peripheral edema [Grossly Normal Strength/Tone] : grossly normal strength/tone [Normal Gait] : normal gait [Speech Grossly Normal] : speech grossly normal [Normal Affect] : the affect was normal [Normal Mood] : the mood was normal

## 2025-07-24 NOTE — HISTORY OF PRESENT ILLNESS
[de-identified] : HOLLY STOREY is a 65 year F who presents today for BP check. She is now on Lotrel 2.5/10 mg daily. She says home BP are now low. SBP are under 110.  She even had an episode of dizziness. .